# Patient Record
Sex: MALE | Race: BLACK OR AFRICAN AMERICAN | NOT HISPANIC OR LATINO | Employment: FULL TIME | URBAN - METROPOLITAN AREA
[De-identification: names, ages, dates, MRNs, and addresses within clinical notes are randomized per-mention and may not be internally consistent; named-entity substitution may affect disease eponyms.]

---

## 2017-01-25 ENCOUNTER — ALLSCRIPTS OFFICE VISIT (OUTPATIENT)
Dept: OTHER | Facility: OTHER | Age: 42
End: 2017-01-25

## 2017-01-25 DIAGNOSIS — M54.2 CERVICALGIA: ICD-10-CM

## 2017-01-31 ENCOUNTER — HOSPITAL ENCOUNTER (OUTPATIENT)
Dept: RADIOLOGY | Facility: HOSPITAL | Age: 42
Discharge: HOME | End: 2017-01-31
Payer: COMMERCIAL

## 2017-01-31 DIAGNOSIS — M54.2 CERVICALGIA: ICD-10-CM

## 2017-01-31 PROCEDURE — 73030 X-RAY EXAM OF SHOULDER: CPT

## 2017-01-31 PROCEDURE — 72050 X-RAY EXAM NECK SPINE 4/5VWS: CPT

## 2017-02-01 ENCOUNTER — GENERIC CONVERSION - ENCOUNTER (OUTPATIENT)
Dept: OTHER | Facility: OTHER | Age: 42
End: 2017-02-01

## 2017-02-14 ENCOUNTER — APPOINTMENT (OUTPATIENT)
Dept: PHYSICAL THERAPY | Facility: CLINIC | Age: 42
End: 2017-02-14
Payer: COMMERCIAL

## 2017-02-14 ENCOUNTER — GENERIC CONVERSION - ENCOUNTER (OUTPATIENT)
Dept: OTHER | Facility: OTHER | Age: 42
End: 2017-02-14

## 2017-02-14 DIAGNOSIS — M54.2 CERVICALGIA: ICD-10-CM

## 2017-02-14 PROCEDURE — 97110 THERAPEUTIC EXERCISES: CPT

## 2017-02-14 PROCEDURE — 97161 PT EVAL LOW COMPLEX 20 MIN: CPT

## 2017-02-20 ENCOUNTER — APPOINTMENT (OUTPATIENT)
Dept: PHYSICAL THERAPY | Facility: CLINIC | Age: 42
End: 2017-02-20
Payer: COMMERCIAL

## 2017-02-20 PROCEDURE — 97140 MANUAL THERAPY 1/> REGIONS: CPT

## 2017-02-20 PROCEDURE — 97110 THERAPEUTIC EXERCISES: CPT

## 2017-02-23 ENCOUNTER — APPOINTMENT (OUTPATIENT)
Dept: PHYSICAL THERAPY | Facility: CLINIC | Age: 42
End: 2017-02-23
Payer: COMMERCIAL

## 2017-02-23 PROCEDURE — 97110 THERAPEUTIC EXERCISES: CPT

## 2017-02-27 ENCOUNTER — APPOINTMENT (OUTPATIENT)
Dept: PHYSICAL THERAPY | Facility: CLINIC | Age: 42
End: 2017-02-27
Payer: COMMERCIAL

## 2017-02-27 PROCEDURE — 97110 THERAPEUTIC EXERCISES: CPT

## 2017-03-02 ENCOUNTER — APPOINTMENT (OUTPATIENT)
Dept: PHYSICAL THERAPY | Facility: CLINIC | Age: 42
End: 2017-03-02
Payer: COMMERCIAL

## 2017-03-06 ENCOUNTER — APPOINTMENT (OUTPATIENT)
Dept: PHYSICAL THERAPY | Facility: CLINIC | Age: 42
End: 2017-03-06
Payer: COMMERCIAL

## 2017-03-09 ENCOUNTER — APPOINTMENT (OUTPATIENT)
Dept: PHYSICAL THERAPY | Facility: CLINIC | Age: 42
End: 2017-03-09
Payer: COMMERCIAL

## 2017-03-09 PROCEDURE — 97110 THERAPEUTIC EXERCISES: CPT

## 2017-03-13 ENCOUNTER — APPOINTMENT (OUTPATIENT)
Dept: PHYSICAL THERAPY | Facility: CLINIC | Age: 42
End: 2017-03-13
Payer: COMMERCIAL

## 2017-03-13 ENCOUNTER — GENERIC CONVERSION - ENCOUNTER (OUTPATIENT)
Dept: OTHER | Facility: OTHER | Age: 42
End: 2017-03-13

## 2017-03-13 PROCEDURE — 97140 MANUAL THERAPY 1/> REGIONS: CPT

## 2017-03-13 PROCEDURE — 97110 THERAPEUTIC EXERCISES: CPT

## 2017-03-16 ENCOUNTER — APPOINTMENT (OUTPATIENT)
Dept: PHYSICAL THERAPY | Facility: CLINIC | Age: 42
End: 2017-03-16
Payer: COMMERCIAL

## 2017-03-16 PROCEDURE — 97140 MANUAL THERAPY 1/> REGIONS: CPT

## 2017-03-16 PROCEDURE — 97110 THERAPEUTIC EXERCISES: CPT

## 2017-03-20 ENCOUNTER — APPOINTMENT (OUTPATIENT)
Dept: PHYSICAL THERAPY | Facility: CLINIC | Age: 42
End: 2017-03-20
Payer: COMMERCIAL

## 2017-03-20 PROCEDURE — 97110 THERAPEUTIC EXERCISES: CPT

## 2017-03-20 PROCEDURE — 97140 MANUAL THERAPY 1/> REGIONS: CPT

## 2017-03-23 ENCOUNTER — APPOINTMENT (OUTPATIENT)
Dept: PHYSICAL THERAPY | Facility: CLINIC | Age: 42
End: 2017-03-23
Payer: COMMERCIAL

## 2017-03-23 PROCEDURE — 97110 THERAPEUTIC EXERCISES: CPT

## 2017-03-30 ENCOUNTER — APPOINTMENT (OUTPATIENT)
Dept: PHYSICAL THERAPY | Facility: CLINIC | Age: 42
End: 2017-03-30
Payer: COMMERCIAL

## 2017-03-30 PROCEDURE — 97110 THERAPEUTIC EXERCISES: CPT

## 2017-03-30 PROCEDURE — 97140 MANUAL THERAPY 1/> REGIONS: CPT

## 2017-04-03 ENCOUNTER — APPOINTMENT (OUTPATIENT)
Dept: PHYSICAL THERAPY | Facility: CLINIC | Age: 42
End: 2017-04-03
Payer: COMMERCIAL

## 2017-04-03 PROCEDURE — 97110 THERAPEUTIC EXERCISES: CPT

## 2017-04-05 ENCOUNTER — APPOINTMENT (OUTPATIENT)
Dept: PHYSICAL THERAPY | Facility: CLINIC | Age: 42
End: 2017-04-05
Payer: COMMERCIAL

## 2017-04-05 PROCEDURE — 97110 THERAPEUTIC EXERCISES: CPT

## 2017-04-12 ENCOUNTER — GENERIC CONVERSION - ENCOUNTER (OUTPATIENT)
Dept: OTHER | Facility: OTHER | Age: 42
End: 2017-04-12

## 2017-04-12 ENCOUNTER — APPOINTMENT (OUTPATIENT)
Dept: PHYSICAL THERAPY | Facility: CLINIC | Age: 42
End: 2017-04-12
Payer: COMMERCIAL

## 2017-04-12 PROCEDURE — 97110 THERAPEUTIC EXERCISES: CPT

## 2017-04-12 PROCEDURE — 97140 MANUAL THERAPY 1/> REGIONS: CPT

## 2017-04-13 ENCOUNTER — APPOINTMENT (OUTPATIENT)
Dept: PHYSICAL THERAPY | Facility: CLINIC | Age: 42
End: 2017-04-13
Payer: COMMERCIAL

## 2018-01-11 NOTE — RESULT NOTES
Verified Results  * XR SPINE CERVICAL COMPLETE 4 OR 5 VW NON INJURY 31Jan2017 01:29PM Vaishali Mate Order Number: [id number]     Test Name Result Flag Reference   XR SPINE CERVICAL COMPLETE 4 OR 5 VW (Report)     CERVICAL SPINE     INDICATION: Neck pain  COMPARISON: None     VIEWS: 5; 6 images     FINDINGS:     No evidence of fracture or subluxation  Minimal disc space narrowing seen at the C5-C6 level  The remaining disc spaces are preserved  The neural foramina are patent  The prevertebral soft tissues are within normal limits  The lung apices are intact  IMPRESSION:     Minimal degenerative changes C5-C6       Workstation performed: [id number]     Signed by:   Samantha Bain MD   2/1/17     * XR SHOULDER 2+ VIEW RIGHT [id number] 01:29PM Vaishali Mate Order Number: [id number]     Test Name Result Flag Reference   XR SHOULDER 2+ VW RIGHT (Report)     RIGHT SHOULDER     INDICATION: Right shoulder pain  COMPARISON: None     VIEWS: 3; 3 images     FINDINGS:     There is no acute fracture or dislocation  No degenerative changes  No lytic or blastic lesions are seen  Soft tissues are unremarkable  IMPRESSION:     No acute osseous abnormality         Workstation performed: [id number]     Signed by:   Andrzej Pringle MD   2/1/17

## 2018-01-15 VITALS
DIASTOLIC BLOOD PRESSURE: 100 MMHG | WEIGHT: 251.8 LBS | HEIGHT: 75 IN | SYSTOLIC BLOOD PRESSURE: 154 MMHG | HEART RATE: 86 BPM | BODY MASS INDEX: 31.31 KG/M2

## 2018-01-15 NOTE — RESULT NOTES
Verified Results  [address] [place] [address] [id number] 11:41AM Laure Holman Order Number: [id number]     Test Name Result Flag Reference   US THYROID (Report)     THYROID ULTRASOUND     INDICATION: Cervicalgia     COMPARISON: None  TECHNIQUE:  Ultrasound of the thyroid was performed with a high frequency linear transducer in transverse and sagittal planes including volumetric imaging sweeps as well as traditional still imaging technique  FINDINGS:   Normal homogeneous smooth echotexture  Right gland: 5 5 x 1 6 x 1 9 cm  No dominant nodules  Left gland: 5 2 x 1 3 x 1 8 cm  8 7 x 5 6 x 8 3 mm left lower pole isoechoic, hypovascular nodule         Isthmus: 0 3 cm in AP dimension  No dominant nodules  IMPRESSION:     The 8 7 x 5 6 x 8 3 left lower pole nodule  (Image number 54 and 58) (CRITERIA: Low suspicion sonographic pattern, >/= 1 5 cm )     Reference: 2015 American Thyroid Association Management Guidelines for Adult Patients with Thyroid Nodules and Differentiated Thyroid Cancer  Thyroid, Volume 26, Number 1, 2016  LOW SUSPICION PATTERN (estimated risk of malignancy 5-10%): Solid isoechoic or hyperechoic nodule, or partially cystic nodule with eccentric solid areas without microcalcification, abnormal margins, or taller than wide  BIOPSY AT>/= 1 5 cm         Workstation performed: [id number]     Signed by:   Earl Connor MD   5/6/16     (1) CBC/PLT/DIFF [id number] 01:24PM Willaim Fill     Test Name Result Flag Reference   WHITE BLOOD CELL COUNT 5 7 Thousand/uL  3 8-10 8   RED BLOOD CELL COUNT 5 08 Million/uL  4 20-5 80   HEMOGLOBIN 15 1 g/dL  13 2-17 1   HEMATOCRIT 44 7 %  38 5-50 0   MCV 88 0 fL  80 0-100 0   MCH 29 6 pg  27 0-33 0   MCHC 33 7 g/dL  32 0-36 0   RDW 13 5 %  11 0-15 0   PLATELET COUNT 773 Thousand/uL  140-400   MPV 9 3 fL  7 5-11 5   ABSOLUTE NEUTROPHILS 3471 cells/uL  2933-2330   ABSOLUTE LYMPHOCYTES 1676 cells/uL  850-3900   ABSOLUTE MONOCYTES 410 cells/uL 200-950   ABSOLUTE EOSINOPHILS 120 cells/uL     ABSOLUTE BASOPHILS 23 cells/uL  0-200   NEUTROPHILS 60 9 %     LYMPHOCYTES 29 4 %     MONOCYTES 7 2 %     EOSINOPHILS 2 1 %     BASOPHILS 0 4 %       (1) BASIC METABOLIC PROFILE [id number] 01:24PM Jose Lara     Test Name Result Flag Reference   GLUCOSE 94 mg/dL  65-99   Fasting reference interval   UREA NITROGEN (BUN) 15 mg/dL  7-25   CREATININE 1 24 mg/dL  0 60-1 35   eGFR NON-AFR   AMERICAN 73 mL/min/1 73m2  > OR = 60   eGFR AFRICAN AMERICAN 84 mL/min/1 73m2  > OR = 60   BUN/CREATININE RATIO   7-98   NOT APPLICABLE (calc)   SODIUM 139 mmol/L  135-146   POTASSIUM 4 3 mmol/L  3 5-5 3   CHLORIDE 103 mmol/L     CARBON DIOXIDE 24 mmol/L  19-30   CALCIUM 9 9 mg/dL  8 6-10 3     (Q) TSH, 3RD GENERATION [id number] 01:24PM Jose Lara   REPORT COMMENT:  FASTING:YES     Test Name Result Flag Reference   TSH 1 52 mIU/L  0 40-4 50

## 2018-05-03 RX ORDER — LISINOPRIL AND HYDROCHLOROTHIAZIDE 12.5; 1 MG/1; MG/1
1 TABLET ORAL DAILY
COMMUNITY
Start: 2014-12-05 | End: 2018-05-04 | Stop reason: SDUPTHER

## 2018-05-03 RX ORDER — METAXALONE 800 MG/1
TABLET ORAL 3 TIMES DAILY PRN
COMMUNITY
Start: 2017-01-25 | End: 2018-12-12 | Stop reason: CLARIF

## 2018-05-04 ENCOUNTER — OFFICE VISIT (OUTPATIENT)
Dept: FAMILY MEDICINE CLINIC | Facility: CLINIC | Age: 43
End: 2018-05-04
Payer: COMMERCIAL

## 2018-05-04 VITALS
HEART RATE: 84 BPM | DIASTOLIC BLOOD PRESSURE: 104 MMHG | WEIGHT: 237.8 LBS | RESPIRATION RATE: 18 BRPM | SYSTOLIC BLOOD PRESSURE: 160 MMHG | TEMPERATURE: 98.2 F | BODY MASS INDEX: 29.57 KG/M2 | HEIGHT: 75 IN

## 2018-05-04 DIAGNOSIS — E55.9 VITAMIN D DEFICIENCY: ICD-10-CM

## 2018-05-04 DIAGNOSIS — R53.81 MALAISE AND FATIGUE: ICD-10-CM

## 2018-05-04 DIAGNOSIS — I10 ESSENTIAL HYPERTENSION: Primary | ICD-10-CM

## 2018-05-04 DIAGNOSIS — R53.83 MALAISE AND FATIGUE: ICD-10-CM

## 2018-05-04 DIAGNOSIS — M25.561 ACUTE PAIN OF RIGHT KNEE: ICD-10-CM

## 2018-05-04 PROCEDURE — 99214 OFFICE O/P EST MOD 30 MIN: CPT | Performed by: FAMILY MEDICINE

## 2018-05-04 PROCEDURE — 3008F BODY MASS INDEX DOCD: CPT | Performed by: FAMILY MEDICINE

## 2018-05-04 RX ORDER — TADALAFIL 5 MG/1
5 TABLET ORAL
COMMUNITY
Start: 2013-03-26 | End: 2019-06-21 | Stop reason: ALTCHOICE

## 2018-05-04 RX ORDER — LISINOPRIL AND HYDROCHLOROTHIAZIDE 12.5; 1 MG/1; MG/1
1 TABLET ORAL DAILY
Qty: 90 TABLET | Refills: 1 | Status: SHIPPED | OUTPATIENT
Start: 2018-05-04 | End: 2018-11-28 | Stop reason: SDUPTHER

## 2018-05-04 NOTE — PROGRESS NOTES
Assessment/Plan:    No problem-specific Assessment & Plan notes found for this encounter  There are no diagnoses linked to this encounter  Subjective:      Patient ID: Ruthy Moreau is a 40 y o  male  Hypertension   This is a chronic problem  The current episode started more than 1 year ago  The problem is unchanged  The problem is controlled  Pertinent negatives include no anxiety, blurred vision, chest pain, headaches or shortness of breath  There are no associated agents to hypertension  Risk factors for coronary artery disease include male gender  The following portions of the patient's history were reviewed and updated as appropriate: allergies, current medications, past family history, past medical history, past social history, past surgical history and problem list     Review of Systems   Constitutional: Positive for fatigue  Eyes: Negative for blurred vision  Respiratory: Negative for shortness of breath  Cardiovascular: Negative for chest pain  Musculoskeletal: Positive for arthralgias  Knee pain   Neurological: Negative for headaches  Hematological: Does not bruise/bleed easily  Objective:      /98 (BP Location: Left arm, Patient Position: Sitting, Cuff Size: Adult)   Pulse 84   Temp 98 2 °F (36 8 °C) (Temporal)   Resp 18   Ht 6' 3" (1 905 m)   Wt 108 kg (237 lb 12 8 oz)   BMI 29 72 kg/m²          Physical Exam   Constitutional: He appears well-developed and well-nourished  Neck: No thyromegaly present  Cardiovascular: Normal rate, regular rhythm and normal heart sounds  Pulmonary/Chest: Breath sounds normal    Musculoskeletal: He exhibits no edema or tenderness  Lymphadenopathy:     He has no cervical adenopathy

## 2018-05-16 ENCOUNTER — TELEPHONE (OUTPATIENT)
Dept: FAMILY MEDICINE CLINIC | Facility: CLINIC | Age: 43
End: 2018-05-16

## 2018-05-16 NOTE — TELEPHONE ENCOUNTER
[facility] for results and recommendations  The PTH and Phosphorus has been added to pt's lab as per Dr Chang Leak request     ----- Message from Katie Carvajal MD sent at 5/16/2018  9:41 AM EDT -----  Calcium level slightly up-can they add PTH and phosphorus? Vitamin D low nl-how much vitamin D is pt taking?

## 2018-05-17 ENCOUNTER — TELEPHONE (OUTPATIENT)
Dept: FAMILY MEDICINE CLINIC | Facility: CLINIC | Age: 43
End: 2018-05-17

## 2018-05-17 DIAGNOSIS — E83.52 HYPERCALCEMIA: Primary | ICD-10-CM

## 2018-05-17 LAB
25(OH)D3 SERPL-MCNC: 31 NG/ML (ref 30–100)
ALBUMIN SERPL-MCNC: 4.5 G/DL (ref 3.6–5.1)
ALBUMIN/GLOB SERPL: 1.4 (CALC) (ref 1–2.5)
ALP SERPL-CCNC: 59 U/L (ref 40–115)
ALT SERPL-CCNC: 27 U/L (ref 9–46)
AST SERPL-CCNC: 17 U/L (ref 10–40)
BASOPHILS # BLD AUTO: 34 CELLS/UL (ref 0–200)
BASOPHILS NFR BLD AUTO: 0.5 %
BILIRUB SERPL-MCNC: 0.6 MG/DL (ref 0.2–1.2)
BUN SERPL-MCNC: 13 MG/DL (ref 7–25)
BUN/CREAT SERPL: ABNORMAL (CALC) (ref 6–22)
CALCIUM SERPL-MCNC: 10.4 MG/DL (ref 8.6–10.3)
CALCIUM SERPL-MCNC: NORMAL MG/DL
CHLORIDE SERPL-SCNC: 102 MMOL/L (ref 98–110)
CO2 SERPL-SCNC: 26 MMOL/L (ref 20–31)
CREAT SERPL-MCNC: 1.28 MG/DL (ref 0.6–1.35)
EOSINOPHIL # BLD AUTO: 141 CELLS/UL (ref 15–500)
EOSINOPHIL NFR BLD AUTO: 2.1 %
ERYTHROCYTE [DISTWIDTH] IN BLOOD BY AUTOMATED COUNT: 13.9 % (ref 11–15)
GLOBULIN SER CALC-MCNC: 3.2 G/DL (CALC) (ref 1.9–3.7)
GLUCOSE SERPL-MCNC: 90 MG/DL (ref 65–99)
HCT VFR BLD AUTO: 47.6 % (ref 38.5–50)
HGB BLD-MCNC: 16.5 G/DL (ref 13.2–17.1)
LYMPHOCYTES # BLD AUTO: 2278 CELLS/UL (ref 850–3900)
LYMPHOCYTES NFR BLD AUTO: 34 %
MCH RBC QN AUTO: 29.3 PG (ref 27–33)
MCHC RBC AUTO-ENTMCNC: 34.7 G/DL (ref 32–36)
MCV RBC AUTO: 84.4 FL (ref 80–100)
MONOCYTES # BLD AUTO: 556 CELLS/UL (ref 200–950)
MONOCYTES NFR BLD AUTO: 8.3 %
NEUTROPHILS # BLD AUTO: 3692 CELLS/UL (ref 1500–7800)
NEUTROPHILS NFR BLD AUTO: 55.1 %
PHOSPHATE SERPL-MCNC: 4.7 MG/DL (ref 2.5–4.5)
PLATELET # BLD AUTO: 274 THOUSAND/UL (ref 140–400)
PMV BLD REES-ECKER: 10.7 FL (ref 7.5–12.5)
POTASSIUM SERPL-SCNC: 3.9 MMOL/L (ref 3.5–5.3)
PROT SERPL-MCNC: 7.7 G/DL (ref 6.1–8.1)
PTH-INTACT SERPL-MCNC: NORMAL PG/ML
RBC # BLD AUTO: 5.64 MILLION/UL (ref 4.2–5.8)
SL AMB EGFR AFRICAN AMERICAN: 80 ML/MIN/1.73M2
SL AMB EGFR NON AFRICAN AMERICAN: 69 ML/MIN/1.73M2
SODIUM SERPL-SCNC: 139 MMOL/L (ref 135–146)
TSH SERPL-ACNC: 2.36 MIU/L (ref 0.4–4.5)
WBC # BLD AUTO: 6.7 THOUSAND/UL (ref 3.8–10.8)

## 2018-05-17 NOTE — TELEPHONE ENCOUNTER
[facility] for results   Pt new lab order mailed out to home address as requested in previous msg     ----- Message from Larissa Pisano MD sent at 5/17/2018 12:52 PM EDT -----  Phosphorus high also, await redraw of lab for PTH level

## 2018-05-17 NOTE — TELEPHONE ENCOUNTER
Pt notified of calcium and vitamin D results and recommendations   Pt is taking Vitamin D3 25mcg 1000IU     ----- Message from Oren Lovelace MD sent at 5/17/2018  8:25 AM EDT -----  Will need redraw phosphorus/PTH-unable to add, new lab rx done

## 2018-05-18 ENCOUNTER — TELEPHONE (OUTPATIENT)
Dept: FAMILY MEDICINE CLINIC | Facility: CLINIC | Age: 43
End: 2018-05-18

## 2018-05-22 ENCOUNTER — TELEPHONE (OUTPATIENT)
Dept: FAMILY MEDICINE CLINIC | Facility: CLINIC | Age: 43
End: 2018-05-22

## 2018-05-22 LAB
CALCIUM SERPL-MCNC: 9.9 MG/DL (ref 8.6–10.3)
PHOSPHATE SERPL-MCNC: 4.4 MG/DL (ref 2.5–4.5)
PTH-INTACT SERPL-MCNC: 36 PG/ML (ref 14–64)

## 2018-06-04 ENCOUNTER — TELEPHONE (OUTPATIENT)
Dept: FAMILY MEDICINE CLINIC | Facility: CLINIC | Age: 43
End: 2018-06-04

## 2018-11-28 DIAGNOSIS — I10 ESSENTIAL HYPERTENSION: ICD-10-CM

## 2018-11-28 RX ORDER — LISINOPRIL AND HYDROCHLOROTHIAZIDE 12.5; 1 MG/1; MG/1
TABLET ORAL
Qty: 30 TABLET | Refills: 0 | Status: SHIPPED | OUTPATIENT
Start: 2018-11-28 | End: 2018-12-12 | Stop reason: SDUPTHER

## 2018-12-05 ENCOUNTER — TELEPHONE (OUTPATIENT)
Dept: FAMILY MEDICINE CLINIC | Facility: CLINIC | Age: 43
End: 2018-12-05

## 2018-12-12 ENCOUNTER — OFFICE VISIT (OUTPATIENT)
Dept: FAMILY MEDICINE CLINIC | Facility: CLINIC | Age: 43
End: 2018-12-12
Payer: COMMERCIAL

## 2018-12-12 VITALS
OXYGEN SATURATION: 96 % | BODY MASS INDEX: 29.84 KG/M2 | RESPIRATION RATE: 14 BRPM | HEIGHT: 75 IN | TEMPERATURE: 98 F | DIASTOLIC BLOOD PRESSURE: 90 MMHG | WEIGHT: 240 LBS | SYSTOLIC BLOOD PRESSURE: 142 MMHG | HEART RATE: 83 BPM

## 2018-12-12 DIAGNOSIS — I10 ESSENTIAL HYPERTENSION: Primary | ICD-10-CM

## 2018-12-12 DIAGNOSIS — E55.9 VITAMIN D DEFICIENCY: ICD-10-CM

## 2018-12-12 DIAGNOSIS — E83.39 HYPERPHOSPHATEMIA: ICD-10-CM

## 2018-12-12 PROCEDURE — 99213 OFFICE O/P EST LOW 20 MIN: CPT | Performed by: FAMILY MEDICINE

## 2018-12-12 PROCEDURE — 1036F TOBACCO NON-USER: CPT | Performed by: FAMILY MEDICINE

## 2018-12-12 PROCEDURE — 3008F BODY MASS INDEX DOCD: CPT | Performed by: FAMILY MEDICINE

## 2018-12-12 RX ORDER — LISINOPRIL AND HYDROCHLOROTHIAZIDE 12.5; 1 MG/1; MG/1
1 TABLET ORAL DAILY
Qty: 90 TABLET | Refills: 1 | Status: SHIPPED | OUTPATIENT
Start: 2018-12-12 | End: 2019-06-21 | Stop reason: SDUPTHER

## 2018-12-12 NOTE — PROGRESS NOTES
Assessment/Plan:    Essential hypertension  BP stable       Diagnoses and all orders for this visit:    Essential hypertension  -     Comprehensive metabolic panel; Future  -     TSH, 3rd generation; Future  -     lisinopril-hydrochlorothiazide (PRINZIDE,ZESTORETIC) 10-12 5 MG per tablet; Take 1 tablet by mouth daily    Hyperphosphatemia  -     Phosphorus; Future    Vitamin D deficiency  -     Vitamin D 25 hydroxy; Future          Subjective:      Patient ID: Emily Encinas is a 44 y o  male  Hypertension   This is a chronic problem  The current episode started more than 1 year ago  The problem is unchanged  The problem is controlled  Pertinent negatives include no anxiety, blurred vision, chest pain, headaches, palpitations, peripheral edema or shortness of breath  There are no associated agents to hypertension  Risk factors for coronary artery disease include male gender  Past treatments include ACE inhibitors and diuretics  The current treatment provides significant improvement  There are no compliance problems  The following portions of the patient's history were reviewed and updated as appropriate: allergies, current medications, past family history, past medical history, past social history, past surgical history and problem list     Review of Systems   Constitutional: Negative for fatigue  Eyes: Negative for blurred vision  Respiratory: Negative for shortness of breath  Cardiovascular: Negative for chest pain and palpitations  Neurological: Negative for headaches  Psychiatric/Behavioral: The patient is not nervous/anxious  Objective:      /90 (BP Location: Left arm, Patient Position: Sitting, Cuff Size: Large)   Pulse 83   Temp 98 °F (36 7 °C) (Temporal)   Resp 14   Ht 6' 3" (1 905 m)   Wt 109 kg (240 lb)   SpO2 96%   BMI 30 00 kg/m²          Physical Exam   Constitutional: He appears well-developed and well-nourished  Neck: No thyromegaly present  Cardiovascular: Normal rate, regular rhythm and normal heart sounds  Pulmonary/Chest: Breath sounds normal    Musculoskeletal: He exhibits no edema  Lymphadenopathy:     He has no cervical adenopathy  Vitals reviewed

## 2019-05-10 ENCOUNTER — TELEPHONE (OUTPATIENT)
Dept: FAMILY MEDICINE CLINIC | Facility: CLINIC | Age: 44
End: 2019-05-10

## 2019-06-21 ENCOUNTER — OFFICE VISIT (OUTPATIENT)
Dept: FAMILY MEDICINE CLINIC | Facility: CLINIC | Age: 44
End: 2019-06-21
Payer: COMMERCIAL

## 2019-06-21 VITALS
BODY MASS INDEX: 28.62 KG/M2 | DIASTOLIC BLOOD PRESSURE: 92 MMHG | RESPIRATION RATE: 18 BRPM | SYSTOLIC BLOOD PRESSURE: 138 MMHG | HEART RATE: 92 BPM | WEIGHT: 230.2 LBS | TEMPERATURE: 98 F | HEIGHT: 75 IN

## 2019-06-21 DIAGNOSIS — M54.2 NECK PAIN: ICD-10-CM

## 2019-06-21 DIAGNOSIS — M25.511 CHRONIC RIGHT SHOULDER PAIN: ICD-10-CM

## 2019-06-21 DIAGNOSIS — G89.29 CHRONIC RIGHT SHOULDER PAIN: ICD-10-CM

## 2019-06-21 DIAGNOSIS — I10 ESSENTIAL HYPERTENSION: Primary | ICD-10-CM

## 2019-06-21 DIAGNOSIS — E55.9 VITAMIN D DEFICIENCY: ICD-10-CM

## 2019-06-21 PROCEDURE — 3008F BODY MASS INDEX DOCD: CPT | Performed by: FAMILY MEDICINE

## 2019-06-21 PROCEDURE — 1036F TOBACCO NON-USER: CPT | Performed by: FAMILY MEDICINE

## 2019-06-21 PROCEDURE — 99214 OFFICE O/P EST MOD 30 MIN: CPT | Performed by: FAMILY MEDICINE

## 2019-06-21 RX ORDER — LISINOPRIL AND HYDROCHLOROTHIAZIDE 12.5; 1 MG/1; MG/1
1 TABLET ORAL DAILY
Qty: 90 TABLET | Refills: 1 | Status: SHIPPED | OUTPATIENT
Start: 2019-06-21 | End: 2019-09-23 | Stop reason: SDUPTHER

## 2019-09-09 ENCOUNTER — OFFICE VISIT (OUTPATIENT)
Dept: FAMILY MEDICINE CLINIC | Facility: CLINIC | Age: 44
End: 2019-09-09
Payer: COMMERCIAL

## 2019-09-09 VITALS
SYSTOLIC BLOOD PRESSURE: 130 MMHG | BODY MASS INDEX: 28.47 KG/M2 | HEIGHT: 75 IN | TEMPERATURE: 99.2 F | DIASTOLIC BLOOD PRESSURE: 74 MMHG | WEIGHT: 229 LBS

## 2019-09-09 DIAGNOSIS — H16.002 ULCER OF LEFT CORNEA: Primary | ICD-10-CM

## 2019-09-09 PROCEDURE — 99213 OFFICE O/P EST LOW 20 MIN: CPT | Performed by: FAMILY MEDICINE

## 2019-09-09 NOTE — PROGRESS NOTES
Assessment and Plan:    Problem List Items Addressed This Visit     Ulcer of left cornea - Primary     Patient does have what is likely a chronic ulcer on the left eye  Unfortunately, he cannot see very well without contacts, and does not have glasses with him  Based on this, was not able to do staining to evaluate further, or prescribe drops  Would recommend follow with optometrist or ophthalmologist as soon as possible  Patient sees [facility] in [place]  Relevant Orders    Ambulatory referral to Optometry                 Diagnoses and all orders for this visit:    Ulcer of left cornea  -     Ambulatory referral to Optometry; Future              Subjective:      Patient ID: Luther Coronado is a 38 y o  male  CC:    Chief Complaint   Patient presents with    Eye Problem     Left eye red, irritated, light bothers him  Started Friday at work with itching and irritation  When he took out his contact it was ripped  mjs       HPI:    Patient noted some irritation in his eye after he had gotten home from work  To get his contact, noted that the contact was torn  This is on the left eye  Started on Friday at work  He is currently wearing new contacts on the eyes  The following portions of the patient's history were reviewed and updated as appropriate: allergies, current medications, past family history, past medical history, past social history, past surgical history and problem list       Review of Systems   Constitutional: Negative  HENT: Negative  Eyes: Positive for photophobia and redness  Data to review:       Objective:    Vitals:    09/09/19 1048   BP: 130/74   Temp: 99 2 °F (37 3 °C)   Weight: 104 kg (229 lb)   Height: 6' 3" (1 905 m)        Physical Exam   Constitutional: He appears well-developed and well-nourished  Eyes: Pupils are equal, round, and reactive to light  Lids are normal  Left eye exhibits no discharge, no exudate and no hordeolum   No foreign body present in the left eye  Right conjunctiva is not injected  Right conjunctiva has no hemorrhage  Left conjunctiva is injected  Left conjunctiva has no hemorrhage  No scleral icterus  Right eye exhibits normal extraocular motion and no nystagmus  Left eye exhibits normal extraocular motion and no nystagmus  Right pupil is round and reactive  Left pupil is round and reactive  Pupils are equal    Patient does have contact in  I did use slit lamp exam, but no surface abnormalities noted  Unable to use flurescein staining  Nursing note and vitals reviewed

## 2019-09-09 NOTE — ASSESSMENT & PLAN NOTE
Patient does have what is likely a chronic ulcer on the left eye  Unfortunately, he cannot see very well without contacts, and does not have glasses with him  Based on this, was not able to do staining to evaluate further, or prescribe drops  Would recommend follow with optometrist or ophthalmologist as soon as possible  Patient sees [facility] in [place]

## 2019-09-09 NOTE — PATIENT INSTRUCTIONS
Problem List Items Addressed This Visit     Ulcer of left cornea - Primary     Patient does have what is likely a chronic ulcer on the left eye  Unfortunately, he cannot see very well without contacts, and does not have glasses with him  Based on this, was not able to do staining to evaluate further, or prescribe drops  Would recommend follow with optometrist or ophthalmologist as soon as possible  Patient sees [facility] in [place]           Relevant Orders    Ambulatory referral to Optometry

## 2019-09-23 ENCOUNTER — OFFICE VISIT (OUTPATIENT)
Dept: FAMILY MEDICINE CLINIC | Facility: CLINIC | Age: 44
End: 2019-09-23
Payer: COMMERCIAL

## 2019-09-23 VITALS
DIASTOLIC BLOOD PRESSURE: 80 MMHG | SYSTOLIC BLOOD PRESSURE: 140 MMHG | HEIGHT: 75 IN | HEART RATE: 80 BPM | BODY MASS INDEX: 28.6 KG/M2 | WEIGHT: 230 LBS

## 2019-09-23 DIAGNOSIS — H16.002 ULCER OF LEFT CORNEA: Primary | ICD-10-CM

## 2019-09-23 DIAGNOSIS — I10 ESSENTIAL HYPERTENSION: ICD-10-CM

## 2019-09-23 PROCEDURE — 3008F BODY MASS INDEX DOCD: CPT | Performed by: FAMILY MEDICINE

## 2019-09-23 PROCEDURE — 99213 OFFICE O/P EST LOW 20 MIN: CPT | Performed by: FAMILY MEDICINE

## 2019-09-23 RX ORDER — LISINOPRIL AND HYDROCHLOROTHIAZIDE 12.5; 1 MG/1; MG/1
1 TABLET ORAL DAILY
Qty: 90 TABLET | Refills: 1 | Status: SHIPPED | OUTPATIENT
Start: 2019-09-23 | End: 2020-07-14

## 2019-09-23 NOTE — PROGRESS NOTES
Assessment and Plan:    Problem List Items Addressed This Visit     None                 There are no diagnoses linked to this encounter  Subjective:      Patient ID: Praveen Rodriguez is a 38 y o  male  CC:    Chief Complaint   Patient presents with    Follow-up     2 week follow up to left eye lesion  Pt has completed drops prescribed at last OV  - [facility]       HPI:    Hypertension   This is a chronic problem  The current episode started more than 1 year ago  The problem is unchanged  The problem is controlled  Pertinent negatives include no anxiety, blurred vision, chest pain, headaches, malaise/fatigue, palpitations or peripheral edema  There are no associated agents to hypertension  Risk factors for coronary artery disease include male gender  Past treatments include ACE inhibitors and diuretics  The current treatment provides significant improvement  There are no compliance problems  The following portions of the patient's history were reviewed and updated as appropriate: allergies, current medications, past family history, past medical history, past social history, past surgical history and problem list         Review of Systems   Constitutional: Negative for activity change, appetite change, malaise/fatigue and unexpected weight change  Eyes: Negative for blurred vision and visual disturbance  Cardiovascular: Negative for chest pain and palpitations  Neurological: Negative for headaches  Psychiatric/Behavioral: The patient is not nervous/anxious  Data to review:       Objective:    Vitals:    09/23/19 0846   BP: 140/80   BP Location: Left arm   Patient Position: Sitting   Cuff Size: Large   Pulse: 80   Weight: 104 kg (230 lb)   Height: 6' 3" (1 905 m)        Physical Exam   Constitutional: He appears well-developed and well-nourished  Eyes: Conjunctivae are normal  Right eye exhibits no discharge  Left eye exhibits no discharge  Neck: No thyromegaly present  Cardiovascular: Normal rate, regular rhythm and normal heart sounds  Pulmonary/Chest: Effort normal and breath sounds normal    Musculoskeletal: He exhibits no edema  Lymphadenopathy:     He has no cervical adenopathy  Vitals reviewed

## 2020-07-14 DIAGNOSIS — I10 ESSENTIAL HYPERTENSION: ICD-10-CM

## 2020-07-14 RX ORDER — LISINOPRIL AND HYDROCHLOROTHIAZIDE 12.5; 1 MG/1; MG/1
TABLET ORAL
Qty: 30 TABLET | Refills: 0 | Status: SHIPPED | OUTPATIENT
Start: 2020-07-14 | End: 2020-08-26 | Stop reason: SDUPTHER

## 2020-08-18 DIAGNOSIS — I10 ESSENTIAL HYPERTENSION: ICD-10-CM

## 2020-08-18 RX ORDER — LISINOPRIL AND HYDROCHLOROTHIAZIDE 12.5; 1 MG/1; MG/1
TABLET ORAL
Qty: 30 TABLET | Refills: 0 | OUTPATIENT
Start: 2020-08-18

## 2020-08-26 RX ORDER — LISINOPRIL AND HYDROCHLOROTHIAZIDE 12.5; 1 MG/1; MG/1
1 TABLET ORAL DAILY
Qty: 10 TABLET | Refills: 0 | Status: SHIPPED | OUTPATIENT
Start: 2020-08-26 | End: 2020-09-03 | Stop reason: SDUPTHER

## 2020-09-03 ENCOUNTER — OFFICE VISIT (OUTPATIENT)
Dept: FAMILY MEDICINE CLINIC | Facility: CLINIC | Age: 45
End: 2020-09-03
Payer: COMMERCIAL

## 2020-09-03 VITALS
TEMPERATURE: 98.2 F | DIASTOLIC BLOOD PRESSURE: 72 MMHG | HEART RATE: 76 BPM | BODY MASS INDEX: 30.84 KG/M2 | RESPIRATION RATE: 20 BRPM | SYSTOLIC BLOOD PRESSURE: 134 MMHG | HEIGHT: 75 IN | WEIGHT: 248 LBS

## 2020-09-03 DIAGNOSIS — E55.9 VITAMIN D DEFICIENCY: ICD-10-CM

## 2020-09-03 DIAGNOSIS — I10 ESSENTIAL HYPERTENSION: Primary | ICD-10-CM

## 2020-09-03 PROBLEM — H16.002 ULCER OF LEFT CORNEA: Status: RESOLVED | Noted: 2019-09-09 | Resolved: 2020-09-03

## 2020-09-03 PROCEDURE — 3078F DIAST BP <80 MM HG: CPT | Performed by: FAMILY MEDICINE

## 2020-09-03 PROCEDURE — 99213 OFFICE O/P EST LOW 20 MIN: CPT | Performed by: FAMILY MEDICINE

## 2020-09-03 PROCEDURE — 3725F SCREEN DEPRESSION PERFORMED: CPT | Performed by: FAMILY MEDICINE

## 2020-09-03 PROCEDURE — 1036F TOBACCO NON-USER: CPT | Performed by: FAMILY MEDICINE

## 2020-09-03 RX ORDER — LISINOPRIL AND HYDROCHLOROTHIAZIDE 12.5; 1 MG/1; MG/1
1 TABLET ORAL DAILY
Qty: 90 TABLET | Refills: 1 | Status: SHIPPED | OUTPATIENT
Start: 2020-09-03 | End: 2020-12-03 | Stop reason: SDUPTHER

## 2020-09-03 NOTE — PROGRESS NOTES
BMI Counseling: Body mass index is 31 kg/m²  The BMI is above normal  Nutrition recommendations include reducing portion sizes, decreasing overall calorie intake, 3-5 servings of fruits/vegetables daily, reducing fast food intake and consuming healthier snacks  Exercise recommendations include exercising 3-5 times per week

## 2020-09-03 NOTE — PROGRESS NOTES
Assessment and Plan:    Problem List Items Addressed This Visit     None                 Diagnoses and all orders for this visit:    Essential hypertension    BMI 31 0-31 9,adult              Subjective:      Patient ID: Isabella Posada is a 41 y o  male  CC:    Chief Complaint   Patient presents with    Follow-up     Patient present today for a follow up on his medications  HPI:    Hypertension   This is a chronic problem  The current episode started more than 1 year ago  The problem is unchanged  The problem is controlled  Pertinent negatives include no chest pain, headaches, peripheral edema or shortness of breath  Risk factors for coronary artery disease include obesity and male gender  Past treatments include diuretics and ACE inhibitors  The current treatment provides significant improvement  There are no compliance problems  The following portions of the patient's history were reviewed and updated as appropriate: allergies, current medications, past family history, past medical history, past social history, past surgical history and problem list         Review of Systems   Constitutional: Negative for activity change, appetite change and fatigue  Respiratory: Negative for shortness of breath  Cardiovascular: Negative for chest pain  Neurological: Negative for headaches  Data to review:       Objective:    Vitals:    09/03/20 1233   BP: 134/72   BP Location: Left arm   Patient Position: Sitting   Cuff Size: Large   Pulse: 76   Resp: 20   Temp: 98 2 °F (36 8 °C)   TempSrc: Temporal   Weight: 112 kg (248 lb)   Height: 6' 3" (1 905 m)        Physical Exam  Vitals signs reviewed  Constitutional:       Appearance: Normal appearance  He is obese  Eyes:      General:         Right eye: No discharge  Left eye: No discharge  Neck:      Vascular: No carotid bruit  Cardiovascular:      Rate and Rhythm: Normal rate and regular rhythm  Pulses: Normal pulses        Heart sounds: Normal heart sounds  Pulmonary:      Effort: Pulmonary effort is normal       Breath sounds: Normal breath sounds  Musculoskeletal:      Right lower leg: No edema  Left lower leg: No edema  Lymphadenopathy:      Cervical: No cervical adenopathy  Neurological:      Mental Status: He is alert

## 2020-09-03 NOTE — PATIENT INSTRUCTIONS
rec tetanus shot,await lab  Obesity   AMBULATORY CARE:   Obesity  is when your body mass index (BMI) is greater than 30  Your healthcare provider will use your height and weight to measure your BMI  The risks of obesity include  many health problems, such as injuries or physical disability  You may need tests to check for the following:  · Diabetes     · High blood pressure or high cholesterol     · Heart disease     · Gallbladder or liver disease     · Cancer of the colon, breast, prostate, liver, or kidney     · Sleep apnea     · Arthritis or gout  Seek care immediately if:   · You have a severe headache, confusion, or difficulty speaking  · You have weakness on one side of your body  · You have chest pain, sweating, or shortness of breath  Contact your healthcare provider if:   · You have symptoms of gallbladder or liver disease, such as pain in your upper abdomen  · You have knee or hip pain and discomfort while walking  · You have symptoms of diabetes, such as intense hunger and thirst, and frequent urination  · You have symptoms of sleep apnea, such as snoring or daytime sleepiness  · You have questions or concerns about your condition or care  Treatment for obesity  focuses on helping you lose weight to improve your health  Even a small decrease in BMI can reduce the risk for many health problems  Your healthcare provider will help you set a weight-loss goal   · Lifestyle changes  are the first step in treating obesity  These include making healthy food choices and getting regular physical activity  Your healthcare provider may suggest a weight-loss program that involves coaching, education, and therapy  · Medicine  may help you lose weight when it is used with a healthy diet and physical activity  · Surgery  can help you lose weight if you are very obese and have other health problems  There are several types of weight-loss surgery   Ask your healthcare provider for more information  Be successful losing weight:   · Set small, realistic goals  An example of a small goal is to walk for 20 minutes 5 days a week  Anther goal is to lose 5% of your body weight  · Tell friends, family members, and coworkers about your goals  and ask for their support  Ask a friend to lose weight with you, or join a weight-loss support group  · Identify foods or triggers that may cause you to overeat , and find ways to avoid them  Remove tempting high-calorie foods from your home and workplace  Place a bowl of fresh fruit on your kitchen counter  If stress causes you to eat, then find other ways to cope with stress  · Keep a diary to track what you eat and drink  Also write down how many minutes of physical activity you do each day  Weigh yourself once a week and record it in your diary  Eating changes: You will need to eat 500 to 1,000 fewer calories each day than you currently eat to lose 1 to 2 pounds a week  The following changes will help you cut calories:  · Eat smaller portions  Use small plates, no larger than 9 inches in diameter  Fill your plate half full of fruits and vegetables  Measure your food using measuring cups until you know what a serving size looks like  · Eat 3 meals and 1 or 2 snacks each day  Plan your meals in advance  Estefany Reyes and eat at home most of the time  Eat slowly  · Eat fruits and vegetables at every meal   They are low in calories and high in fiber, which makes you feel full  Do not add butter, margarine, or cream sauce to vegetables  Use herbs to season steamed vegetables  · Eat less fat and fewer fried foods  Eat more baked or grilled chicken and fish  These protein sources are lower in calories and fat than red meat  Limit fast food  Dress your salads with olive oil and vinegar instead of bottled dressing  · Limit the amount of sugar you eat  Do not drink sugary beverages  Limit alcohol    Activity changes:  Physical activity is good for your body in many ways  It helps you burn calories and build strong muscles  It decreases stress and depression, and improves your mood  It can also help you sleep better  Talk to your healthcare provider before you begin an exercise program   · Exercise for at least 30 minutes 5 days a week  Start slowly  Set aside time each day for physical activity that you enjoy and that is convenient for you  It is best to do both weight training and an activity that increases your heart rate, such as walking, bicycling, or swimming  · Find ways to be more active  Do yard work and housecleaning  Walk up the stairs instead of using elevators  Spend your leisure time going to events that require walking, such as outdoor festivals or fairs  This extra physical activity can help you lose weight and keep it off  Follow up with your healthcare provider as directed: You may need to meet with a dietitian  Write down your questions so you remember to ask them during your visits  © 2017 [facility] Information is for End User's use only and may not be sold, redistributed or otherwise used for commercial purposes  All illustrations and images included in CareNotes® are the copyrighted property of [employer]  or Ankit Whitley  The above information is an  only  It is not intended as medical advice for individual conditions or treatments  Talk to your doctor, nurse or pharmacist before following any medical regimen to see if it is safe and effective for you  Weight Management   AMBULATORY CARE:   Why it is important to manage your weight:  Being overweight increases your risk of health conditions such as heart disease, high blood pressure, type 2 diabetes, and certain types of cancer  It can also increase your risk for osteoarthritis, sleep apnea, and other respiratory problems  Aim for a slow, steady weight loss   Even a small amount of weight loss can lower your risk of health problems  How to lose weight safely:  A safe and healthy way to lose weight is to eat fewer calories and get regular exercise  You can lose up about 1 pound a week by decreasing the number of calories you eat by 500 calories each day  You can decrease calories by eating smaller portion sizes or by cutting out high-calorie foods  Read labels to find out how many calories are in the foods you eat  You can also burn calories with exercise such as walking, swimming, or biking  You will be more likely to keep weight off if you make these changes part of your lifestyle  Healthy meal plan for weight management:  A healthy meal plan includes a variety of foods, contains fewer calories, and helps you stay healthy  A healthy meal plan includes the following:  · Eat whole-grain foods more often  A healthy meal plan should contain fiber  Fiber is the part of grains, fruits, and vegetables that is not broken down by your body  Whole-grain foods are healthy and provide extra fiber in your diet  Some examples of whole-grain foods are whole-wheat breads and pastas, oatmeal, brown rice, and bulgur  · Eat a variety of vegetables every day  Include dark, leafy greens such as spinach, kale, orestes greens, and mustard greens  Eat yellow and orange vegetables such as carrots, sweet potatoes, and winter squash  · Eat a variety of fruits every day  Choose fresh or canned fruit (canned in its own juice or light syrup) instead of juice  Fruit juice has very little or no fiber  · Eat low-fat dairy foods  Drink fat-free (skim) milk or 1% milk  Eat fat-free yogurt and low-fat cottage cheese  Try low-fat cheeses such as mozzarella and other reduced-fat cheeses  · Choose meat and other protein foods that are low in fat  Choose beans or other legumes such as split peas or lentils  Choose fish, skinless poultry (chicken or turkey), or lean cuts of red meat (beef or pork)   Before you cook meat or poultry, cut off any visible fat      · Use less fat and oil  Try baking foods instead of frying them  Add less fat, such as margarine, sour cream, regular salad dressing and mayonnaise to foods  Eat fewer high-fat foods  Some examples of high-fat foods include french fries, doughnuts, ice cream, and cakes  · Eat fewer sweets  Limit foods and drinks that are high in sugar  This includes candy, cookies, regular soda, and sweetened drinks  Ways to decrease calories:   · Eat smaller portions  ¨ Use a small plate with smaller servings  ¨ Do not eat second helpings  ¨ When you eat at a restaurant, ask for a box and place half of your meal in the box before you eat  ¨ Share an entrée with someone else  · Replace high-calorie snacks with healthy, low-calorie snacks  ¨ Choose fresh fruit, vegetables, fat-free rice cakes, or air-popped popcorn instead of potato chips, nuts, or chocolate  ¨ Choose water or calorie-free drinks instead of soda or sweetened drinks  · Eat regular meals  Skipping meals can lead to overeating later in the day  Eat a healthy snack in place of a meal if you do not have time to eat a regular meal      · Do not shop for groceries when you are hungry  You may be more likely to make unhealthy food choices  Take a grocery list of healthy foods and shop after you have eaten  Exercise:  Exercise at least 30 minutes per day on most days of the week  Some examples of exercise include walking, biking, dancing, and swimming  You can also fit in more physical activity by taking the stairs instead of the elevator or parking farther away from stores  Ask your healthcare provider about the best exercise plan for you  Other things to consider as you try to lose weight:   · Be aware of situations that may give you the urge to overeat, such as eating while watching television  Find ways to avoid these situations  For example, read a book, go for a walk, or do crafts      · Meet with a weight loss support group or friends who are also trying to lose weight  This may help you stay motivated to continue working on your weight loss goals  © 2017 [address] Information is for End User's use only and may not be sold, redistributed or otherwise used for commercial purposes  All illustrations and images included in CareNotes® are the copyrighted property of [employer]  or Ankit Whitley  The above information is an  only  It is not intended as medical advice for individual conditions or treatments  Talk to your doctor, nurse or pharmacist before following any medical regimen to see if it is safe and effective for you

## 2020-09-18 LAB
25(OH)D3 SERPL-MCNC: 35 NG/ML (ref 30–100)
ALBUMIN SERPL-MCNC: 4.7 G/DL (ref 3.6–5.1)
ALBUMIN/GLOB SERPL: 1.6 (CALC) (ref 1–2.5)
ALP SERPL-CCNC: 57 U/L (ref 36–130)
ALT SERPL-CCNC: 48 U/L (ref 9–46)
AST SERPL-CCNC: 24 U/L (ref 10–40)
BASOPHILS # BLD AUTO: 40 CELLS/UL (ref 0–200)
BASOPHILS NFR BLD AUTO: 0.6 %
BILIRUB SERPL-MCNC: 0.7 MG/DL (ref 0.2–1.2)
BUN SERPL-MCNC: 15 MG/DL (ref 7–25)
BUN/CREAT SERPL: 11 (CALC) (ref 6–22)
CALCIUM SERPL-MCNC: 10.1 MG/DL (ref 8.6–10.3)
CHLORIDE SERPL-SCNC: 102 MMOL/L (ref 98–110)
CO2 SERPL-SCNC: 28 MMOL/L (ref 20–32)
CREAT SERPL-MCNC: 1.41 MG/DL (ref 0.6–1.35)
EOSINOPHIL # BLD AUTO: 107 CELLS/UL (ref 15–500)
EOSINOPHIL NFR BLD AUTO: 1.6 %
ERYTHROCYTE [DISTWIDTH] IN BLOOD BY AUTOMATED COUNT: 13.7 % (ref 11–15)
GLOBULIN SER CALC-MCNC: 2.9 G/DL (CALC) (ref 1.9–3.7)
GLUCOSE SERPL-MCNC: 117 MG/DL (ref 65–99)
HCT VFR BLD AUTO: 46.7 % (ref 38.5–50)
HGB BLD-MCNC: 16.4 G/DL (ref 13.2–17.1)
LYMPHOCYTES # BLD AUTO: 1910 CELLS/UL (ref 850–3900)
LYMPHOCYTES NFR BLD AUTO: 28.5 %
MCH RBC QN AUTO: 30.3 PG (ref 27–33)
MCHC RBC AUTO-ENTMCNC: 35.1 G/DL (ref 32–36)
MCV RBC AUTO: 86.2 FL (ref 80–100)
MONOCYTES # BLD AUTO: 529 CELLS/UL (ref 200–950)
MONOCYTES NFR BLD AUTO: 7.9 %
NEUTROPHILS # BLD AUTO: 4114 CELLS/UL (ref 1500–7800)
NEUTROPHILS NFR BLD AUTO: 61.4 %
PLATELET # BLD AUTO: 264 THOUSAND/UL (ref 140–400)
PMV BLD REES-ECKER: 11.3 FL (ref 7.5–12.5)
POTASSIUM SERPL-SCNC: 4.1 MMOL/L (ref 3.5–5.3)
PROT SERPL-MCNC: 7.6 G/DL (ref 6.1–8.1)
RBC # BLD AUTO: 5.42 MILLION/UL (ref 4.2–5.8)
SL AMB EGFR AFRICAN AMERICAN: 70 ML/MIN/1.73M2
SL AMB EGFR NON AFRICAN AMERICAN: 60 ML/MIN/1.73M2
SODIUM SERPL-SCNC: 139 MMOL/L (ref 135–146)
TSH SERPL-ACNC: 2.09 MIU/L (ref 0.4–4.5)
WBC # BLD AUTO: 6.7 THOUSAND/UL (ref 3.8–10.8)

## 2020-09-21 DIAGNOSIS — R73.02 IMPAIRED GLUCOSE TOLERANCE: Primary | ICD-10-CM

## 2020-09-30 ENCOUNTER — APPOINTMENT (OUTPATIENT)
Dept: LAB | Facility: HOSPITAL | Age: 45
End: 2020-09-30
Payer: COMMERCIAL

## 2020-09-30 DIAGNOSIS — E55.9 VITAMIN D DEFICIENCY: ICD-10-CM

## 2020-09-30 DIAGNOSIS — R73.02 IMPAIRED GLUCOSE TOLERANCE: ICD-10-CM

## 2020-09-30 DIAGNOSIS — I10 ESSENTIAL HYPERTENSION: ICD-10-CM

## 2020-09-30 LAB
25(OH)D3 SERPL-MCNC: 30.8 NG/ML (ref 30–100)
ALBUMIN SERPL BCP-MCNC: 4.1 G/DL (ref 3.5–5)
ALP SERPL-CCNC: 56 U/L (ref 46–116)
ALT SERPL W P-5'-P-CCNC: 51 U/L (ref 12–78)
ANION GAP SERPL CALCULATED.3IONS-SCNC: 9 MMOL/L (ref 4–13)
AST SERPL W P-5'-P-CCNC: 26 U/L (ref 5–45)
BASOPHILS # BLD AUTO: 0.03 THOUSANDS/ΜL (ref 0–0.1)
BASOPHILS NFR BLD AUTO: 1 % (ref 0–1)
BILIRUB SERPL-MCNC: 0.51 MG/DL (ref 0.2–1)
BUN SERPL-MCNC: 17 MG/DL (ref 5–25)
CALCIUM SERPL-MCNC: 9.3 MG/DL (ref 8.3–10.1)
CHLORIDE SERPL-SCNC: 101 MMOL/L (ref 100–108)
CO2 SERPL-SCNC: 26 MMOL/L (ref 21–32)
CREAT SERPL-MCNC: 1.18 MG/DL (ref 0.6–1.3)
EOSINOPHIL # BLD AUTO: 0.13 THOUSAND/ΜL (ref 0–0.61)
EOSINOPHIL NFR BLD AUTO: 2 % (ref 0–6)
ERYTHROCYTE [DISTWIDTH] IN BLOOD BY AUTOMATED COUNT: 12.6 % (ref 11.6–15.1)
EST. AVERAGE GLUCOSE BLD GHB EST-MCNC: 111 MG/DL
GFR SERPL CREATININE-BSD FRML MDRD: 86 ML/MIN/1.73SQ M
GLUCOSE P FAST SERPL-MCNC: 113 MG/DL (ref 65–99)
HBA1C MFR BLD: 5.5 %
HCT VFR BLD AUTO: 44 % (ref 36.5–49.3)
HGB BLD-MCNC: 15.3 G/DL (ref 12–17)
IMM GRANULOCYTES # BLD AUTO: 0.02 THOUSAND/UL (ref 0–0.2)
IMM GRANULOCYTES NFR BLD AUTO: 0 % (ref 0–2)
LYMPHOCYTES # BLD AUTO: 2.01 THOUSANDS/ΜL (ref 0.6–4.47)
LYMPHOCYTES NFR BLD AUTO: 31 % (ref 14–44)
MCH RBC QN AUTO: 30.3 PG (ref 26.8–34.3)
MCHC RBC AUTO-ENTMCNC: 34.8 G/DL (ref 31.4–37.4)
MCV RBC AUTO: 87 FL (ref 82–98)
MONOCYTES # BLD AUTO: 0.5 THOUSAND/ΜL (ref 0.17–1.22)
MONOCYTES NFR BLD AUTO: 8 % (ref 4–12)
NEUTROPHILS # BLD AUTO: 3.78 THOUSANDS/ΜL (ref 1.85–7.62)
NEUTS SEG NFR BLD AUTO: 58 % (ref 43–75)
NRBC BLD AUTO-RTO: 0 /100 WBCS
PLATELET # BLD AUTO: 242 THOUSANDS/UL (ref 149–390)
PMV BLD AUTO: 10.3 FL (ref 8.9–12.7)
POTASSIUM SERPL-SCNC: 3.7 MMOL/L (ref 3.5–5.3)
PROT SERPL-MCNC: 8.1 G/DL (ref 6.4–8.2)
RBC # BLD AUTO: 5.05 MILLION/UL (ref 3.88–5.62)
SODIUM SERPL-SCNC: 136 MMOL/L (ref 136–145)
TSH SERPL DL<=0.05 MIU/L-ACNC: 1.37 UIU/ML (ref 0.36–3.74)
WBC # BLD AUTO: 6.47 THOUSAND/UL (ref 4.31–10.16)

## 2020-09-30 PROCEDURE — 36415 COLL VENOUS BLD VENIPUNCTURE: CPT

## 2020-09-30 PROCEDURE — 80053 COMPREHEN METABOLIC PANEL: CPT

## 2020-09-30 PROCEDURE — 82306 VITAMIN D 25 HYDROXY: CPT

## 2020-09-30 PROCEDURE — 84443 ASSAY THYROID STIM HORMONE: CPT

## 2020-09-30 PROCEDURE — 85025 COMPLETE CBC W/AUTO DIFF WBC: CPT

## 2020-09-30 PROCEDURE — 83036 HEMOGLOBIN GLYCOSYLATED A1C: CPT

## 2020-12-03 DIAGNOSIS — I10 ESSENTIAL HYPERTENSION: ICD-10-CM

## 2020-12-03 RX ORDER — LISINOPRIL AND HYDROCHLOROTHIAZIDE 12.5; 1 MG/1; MG/1
1 TABLET ORAL DAILY
Qty: 90 TABLET | Refills: 1 | OUTPATIENT
Start: 2020-12-03

## 2020-12-03 RX ORDER — LISINOPRIL AND HYDROCHLOROTHIAZIDE 12.5; 1 MG/1; MG/1
1 TABLET ORAL DAILY
Qty: 14 TABLET | Refills: 0 | Status: SHIPPED | OUTPATIENT
Start: 2020-12-03 | End: 2020-12-29 | Stop reason: SDUPTHER

## 2020-12-04 ENCOUNTER — VBI (OUTPATIENT)
Dept: ADMINISTRATIVE | Facility: OTHER | Age: 45
End: 2020-12-04

## 2020-12-04 NOTE — TELEPHONE ENCOUNTER
If pt moved to [place] needs to find a provider there-I will send a 2 week prescription to hold him over

## 2020-12-22 DIAGNOSIS — I10 ESSENTIAL HYPERTENSION: ICD-10-CM

## 2020-12-22 RX ORDER — LISINOPRIL AND HYDROCHLOROTHIAZIDE 12.5; 1 MG/1; MG/1
TABLET ORAL
Qty: 14 TABLET | Refills: 0 | OUTPATIENT
Start: 2020-12-22

## 2020-12-29 ENCOUNTER — TELEPHONE (OUTPATIENT)
Dept: FAMILY MEDICINE CLINIC | Facility: CLINIC | Age: 45
End: 2020-12-29

## 2020-12-29 DIAGNOSIS — I10 ESSENTIAL HYPERTENSION: ICD-10-CM

## 2020-12-29 RX ORDER — LISINOPRIL AND HYDROCHLOROTHIAZIDE 12.5; 1 MG/1; MG/1
1 TABLET ORAL DAILY
Qty: 30 TABLET | Refills: 0 | Status: SHIPPED | OUTPATIENT
Start: 2020-12-29

## 2021-01-20 DIAGNOSIS — I10 ESSENTIAL HYPERTENSION: ICD-10-CM

## 2021-01-20 RX ORDER — LISINOPRIL AND HYDROCHLOROTHIAZIDE 12.5; 1 MG/1; MG/1
TABLET ORAL
Qty: 30 TABLET | Refills: 0 | OUTPATIENT
Start: 2021-01-20

## 2023-09-08 NOTE — ASSESSMENT & PLAN NOTE
